# Patient Record
Sex: FEMALE | Race: OTHER | Employment: UNEMPLOYED
[De-identification: names, ages, dates, MRNs, and addresses within clinical notes are randomized per-mention and may not be internally consistent; named-entity substitution may affect disease eponyms.]

---
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
## 2024-02-12 NOTE — PROGRESS NOTES
[facility] [place] PHYSICIAN GROUP  [facility]  DR. ABRAM GOODWIN  [address], [address]  [place] [address]      Date of Visit:  2024  Patient Name: Albina Nelson   Patient :  2005     CHIEF COMPLAINT:     Albina Nelson is a 18 y.o. female who presents today for an general visit to be evaluated for the following condition(s):  Chief Complaint   Patient presents with    3 Month Follow-Up    Asthma     REVIEW OF SYSTEM      Review of Systems   Constitutional:  Negative for chills and fever.   HENT:  Negative for congestion and sore throat.    Eyes:  Negative for discharge.   Respiratory:  Negative for chest tightness and shortness of breath.    Cardiovascular:  Negative for chest pain and palpitations.   Gastrointestinal:  Negative for abdominal pain.   Psychiatric/Behavioral:  The patient is nervous/anxious.        HISTORY OF PRESENT ILLNESS     18F PMH exercise induced asthma, bipolar/ADHD/anxiety, PMDD, mid back pain here for productive cough and wheezing.     She has been doing well with her current asthma regimen. She is using the advair as needed if she can't find her albuterol in [place].     She has transitioned to living by herself in her apartment. Her car broke down recently. She admits she has been more stressed out recently in light of this. She was previously seeing Dr. Valle from psychiatry but has been hesitant about seeing provider because she does not agree with her diagnosis of bipolar diosrder. She doesn't want to be stuck on medication for her bipolar disorder and is seeking an as needed medication for anxiety while in college. She was on atarax in the past but she only used it to help her fall asleep. She hasn't gotten around to establishing with another psychiatrist that was dicussed at last visit.     ----    She is having worsening anxiety before taking tests. She has been off lamictal since January for her bipolar disorder. She does not believe she

## 2024-02-13 ENCOUNTER — OFFICE VISIT (OUTPATIENT)
Dept: FAMILY MEDICINE CLINIC | Age: 19
End: 2024-02-13
Payer: COMMERCIAL

## 2024-02-13 VITALS
OXYGEN SATURATION: 98 % | HEIGHT: 63 IN | BODY MASS INDEX: 28 KG/M2 | DIASTOLIC BLOOD PRESSURE: 65 MMHG | TEMPERATURE: 98.4 F | WEIGHT: 158 LBS | HEART RATE: 81 BPM | SYSTOLIC BLOOD PRESSURE: 96 MMHG

## 2024-02-13 DIAGNOSIS — F41.9 ANXIETY: Primary | ICD-10-CM

## 2024-02-13 DIAGNOSIS — F31.0 BIPOLAR AFFECTIVE DISORDER, CURRENT EPISODE HYPOMANIC (HCC): ICD-10-CM

## 2024-02-13 DIAGNOSIS — J45.990 EXERCISE-INDUCED ASTHMA: ICD-10-CM

## 2024-02-13 PROCEDURE — 1036F TOBACCO NON-USER: CPT | Performed by: STUDENT IN AN ORGANIZED HEALTH CARE EDUCATION/TRAINING PROGRAM

## 2024-02-13 PROCEDURE — G8417 CALC BMI ABV UP PARAM F/U: HCPCS | Performed by: STUDENT IN AN ORGANIZED HEALTH CARE EDUCATION/TRAINING PROGRAM

## 2024-02-13 PROCEDURE — G8482 FLU IMMUNIZE ORDER/ADMIN: HCPCS | Performed by: STUDENT IN AN ORGANIZED HEALTH CARE EDUCATION/TRAINING PROGRAM

## 2024-02-13 PROCEDURE — 99214 OFFICE O/P EST MOD 30 MIN: CPT | Performed by: STUDENT IN AN ORGANIZED HEALTH CARE EDUCATION/TRAINING PROGRAM

## 2024-02-13 PROCEDURE — G8427 DOCREV CUR MEDS BY ELIG CLIN: HCPCS | Performed by: STUDENT IN AN ORGANIZED HEALTH CARE EDUCATION/TRAINING PROGRAM

## 2024-03-04 ENCOUNTER — TELEPHONE (OUTPATIENT)
Dept: FAMILY MEDICINE CLINIC | Age: 19
End: 2024-03-04

## 2024-03-04 NOTE — TELEPHONE ENCOUNTER
Patient triaged from [facility], is SOB and has a fever. I instructed her to go to an urgent care and she states she will go.

## 2024-05-16 RX ORDER — ALBUTEROL SULFATE 2.5 MG/3ML
SOLUTION RESPIRATORY (INHALATION)
Qty: 180 ML | Refills: 1 | Status: SHIPPED | OUTPATIENT
Start: 2024-05-16

## 2024-07-26 ENCOUNTER — TELEPHONE (OUTPATIENT)
Dept: FAMILY MEDICINE CLINIC | Age: 19
End: 2024-07-26

## 2024-07-26 NOTE — TELEPHONE ENCOUNTER
----- Message from Yury Rosenthal sent at 7/26/2024 10:40 AM EDT -----  Regarding: ECC Message to Provider  ECC Message to Provider    Relationship to Patient: Guardian, Mother, Myla     Additional Information, pt was been to the urgent care today and been informed that she needs to have her blood work done however pt's mother would like to know if the pt need to have an appt first with the PCP so they could request her blood work done or she can go ahead and do the blood work without seeing the PCP  --------------------------------------------------------------------------------------------------------------------------    Call Back Information: OK to leave message on voicemail  Preferred Call Back Number: Phone [phone]

## 2024-08-01 NOTE — PROGRESS NOTES
[facility] PHYSICIAN GROUP  [facility]  DR. ABRAM GOODWIN  [address], [address]  [place] [address]      Date of Visit:  2024  Patient Name: Albina Nelson   Patient :  2005     CHIEF COMPLAINT:     Albina Nelson is a 19 y.o. female who presents today for an general visit to be evaluated for the following condition(s):  Chief Complaint   Patient presents with    Follow-up     Urgent care on 24   Bruising   Nausea  Fatigue      REVIEW OF SYSTEM      Review of Systems   Constitutional:  Negative for chills and fever.   HENT:  Negative for congestion and sore throat.    Eyes:  Negative for discharge.   Respiratory:  Negative for chest tightness and shortness of breath.    Cardiovascular:  Negative for chest pain and palpitations.   Gastrointestinal:  Negative for abdominal pain.   Skin:  Positive for rash.   Psychiatric/Behavioral:  The patient is nervous/anxious.        HISTORY OF PRESENT ILLNESS     19F PMH exercise induced asthma, bipolar/ADHD/anxiety, PMDD, mid back pain here for productive cough and wheezing.     She has multiple tender bruises along her bilateral legs after starting new job at [employer]. She went to urgent care and had labwork ordred, but didn't do because of insurance. Rash has been going on for 2 weeks. Started on the left leg and has now migrated to the right leg and bilateral arms. Lesions are tender when she pressed on it. Denies any trauma to her legs. She has been doing ibuprofen 400mg about 3x per week and cold compress with minimal relief. She is having nausea too but no emesis. She is taking zofran for this, which her OBGYN prescribed. No GERD, but having softer stool in light of her symptoms. No constipation or blood in her stool. No stomach pain. She hasn't traveled recently. Feeling fatigued as well. No family history CD or UC.     ----    She has been doing well with her current asthma regimen. She is using the advair as needed if

## 2024-08-05 ENCOUNTER — OFFICE VISIT (OUTPATIENT)
Dept: FAMILY MEDICINE CLINIC | Age: 19
End: 2024-08-05
Payer: MEDICAID

## 2024-08-05 ENCOUNTER — HOSPITAL ENCOUNTER (OUTPATIENT)
Age: 19
Discharge: HOME | End: 2024-08-05
Payer: MEDICAID

## 2024-08-05 VITALS
HEART RATE: 64 BPM | HEIGHT: 63 IN | SYSTOLIC BLOOD PRESSURE: 100 MMHG | RESPIRATION RATE: 16 BRPM | TEMPERATURE: 97.3 F | WEIGHT: 145.2 LBS | OXYGEN SATURATION: 100 % | BODY MASS INDEX: 25.73 KG/M2 | DIASTOLIC BLOOD PRESSURE: 64 MMHG

## 2024-08-05 DIAGNOSIS — L30.9 DERMATITIS: ICD-10-CM

## 2024-08-05 DIAGNOSIS — L30.9 DERMATITIS: Primary | ICD-10-CM

## 2024-08-05 DIAGNOSIS — J45.990 EXERCISE-INDUCED ASTHMA: ICD-10-CM

## 2024-08-05 LAB
ASO AB SERPL-ACNC: 285 IU/ML (ref 0–150)
BASOPHILS # BLD: 0.07 K/UL (ref 0–0.2)
BASOPHILS NFR BLD: 1 % (ref 0–2)
CRP SERPL HS-MCNC: 4.5 MG/L (ref 0–5)
EOSINOPHIL # BLD: 0.31 K/UL (ref 0–0.44)
EOSINOPHILS RELATIVE PERCENT: 4 % (ref 1–4)
ERYTHROCYTE [DISTWIDTH] IN BLOOD BY AUTOMATED COUNT: 13 % (ref 11.8–14.4)
ERYTHROCYTE [SEDIMENTATION RATE] IN BLOOD BY PHOTOMETRIC METHOD: 8 MM/HR (ref 0–20)
HCT VFR BLD AUTO: 38.7 % (ref 36.3–47.1)
HGB BLD-MCNC: 12.1 G/DL (ref 11.9–15.1)
IMM GRANULOCYTES # BLD AUTO: <0.03 K/UL (ref 0–0.3)
IMM GRANULOCYTES NFR BLD: 0 %
INR PPP: 1
LYMPHOCYTES NFR BLD: 2.66 K/UL (ref 1.2–5.2)
LYMPHOCYTES RELATIVE PERCENT: 36 % (ref 25–45)
MCH RBC QN AUTO: 29.4 PG (ref 25.2–33.5)
MCHC RBC AUTO-ENTMCNC: 31.3 G/DL (ref 28.4–34.8)
MCV RBC AUTO: 94.2 FL (ref 82.6–102.9)
MONOCYTES NFR BLD: 0.36 K/UL (ref 0.1–1.4)
MONOCYTES NFR BLD: 5 % (ref 2–8)
NEUTROPHILS NFR BLD: 54 % (ref 34–64)
NEUTS SEG NFR BLD: 4.06 K/UL (ref 1.8–8)
NRBC BLD-RTO: 0 PER 100 WBC
PARTIAL THROMBOPLASTIN TIME: 25.8 SEC (ref 21.3–31.3)
PLATELET # BLD AUTO: 352 K/UL (ref 138–453)
PMV BLD AUTO: 10.7 FL (ref 8.1–13.5)
PROTHROMBIN TIME: 11 SEC (ref 9.4–12.6)
RBC # BLD AUTO: 4.11 M/UL (ref 3.95–5.11)
WBC OTHER # BLD: 7.5 K/UL (ref 4.5–13.5)

## 2024-08-05 PROCEDURE — 85245 CLOT FACTOR VIII VW RISTOCTN: CPT

## 2024-08-05 PROCEDURE — 85652 RBC SED RATE AUTOMATED: CPT

## 2024-08-05 PROCEDURE — 99213 OFFICE O/P EST LOW 20 MIN: CPT | Performed by: STUDENT IN AN ORGANIZED HEALTH CARE EDUCATION/TRAINING PROGRAM

## 2024-08-05 PROCEDURE — 85610 PROTHROMBIN TIME: CPT

## 2024-08-05 PROCEDURE — 86063 ANTISTREPTOLYSIN O SCREEN: CPT

## 2024-08-05 PROCEDURE — 85246 CLOT FACTOR VIII VW ANTIGEN: CPT

## 2024-08-05 PROCEDURE — 85730 THROMBOPLASTIN TIME PARTIAL: CPT

## 2024-08-05 PROCEDURE — 86140 C-REACTIVE PROTEIN: CPT

## 2024-08-05 PROCEDURE — 85220 BLOOC CLOT FACTOR V TEST: CPT

## 2024-08-05 PROCEDURE — 36415 COLL VENOUS BLD VENIPUNCTURE: CPT

## 2024-08-05 PROCEDURE — 85025 COMPLETE CBC W/AUTO DIFF WBC: CPT

## 2024-08-05 RX ORDER — PREDNISONE 20 MG/1
20 TABLET ORAL 2 TIMES DAILY
Qty: 10 TABLET | Refills: 0 | Status: SHIPPED | OUTPATIENT
Start: 2024-08-05 | End: 2024-08-10

## 2024-08-05 RX ORDER — FLUTICASONE PROPIONATE AND SALMETEROL 250; 50 UG/1; UG/1
1 POWDER RESPIRATORY (INHALATION) 2 TIMES DAILY PRN
Qty: 60 EACH | Refills: 3 | Status: SHIPPED | OUTPATIENT
Start: 2024-08-05

## 2024-08-05 SDOH — ECONOMIC STABILITY: FOOD INSECURITY: WITHIN THE PAST 12 MONTHS, THE FOOD YOU BOUGHT JUST DIDN'T LAST AND YOU DIDN'T HAVE MONEY TO GET MORE.: NEVER TRUE

## 2024-08-05 SDOH — ECONOMIC STABILITY: HOUSING INSECURITY
IN THE LAST 12 MONTHS, WAS THERE A TIME WHEN YOU DID NOT HAVE A STEADY PLACE TO SLEEP OR SLEPT IN A SHELTER (INCLUDING NOW)?: NO

## 2024-08-05 SDOH — ECONOMIC STABILITY: INCOME INSECURITY: HOW HARD IS IT FOR YOU TO PAY FOR THE VERY BASICS LIKE FOOD, HOUSING, MEDICAL CARE, AND HEATING?: NOT HARD AT ALL

## 2024-08-05 SDOH — ECONOMIC STABILITY: FOOD INSECURITY: WITHIN THE PAST 12 MONTHS, YOU WORRIED THAT YOUR FOOD WOULD RUN OUT BEFORE YOU GOT MONEY TO BUY MORE.: NEVER TRUE

## 2024-08-08 LAB
VWF AG ACT/NOR PPP IA: 117 % (ref 52–214)
VWF:RCO ACT/NOR PPP PL AGG: 102 % (ref 51–215)

## 2024-08-12 LAB — FACT V ACT/NOR PPP: 103 % (ref 50–150)

## 2024-08-15 NOTE — PROGRESS NOTES
[facility]  [facility]  DR. ABRAM GOODWIN  [address], [address]  [place] [address]      Date of Visit:  2024  Patient Name: Albina Nelson   Patient :  2005     CHIEF COMPLAINT:     Albina Nelson is a 19 y.o. female who presents today for an general visit to be evaluated for the following condition(s):  Chief Complaint   Patient presents with    Other     Dermatitis follow up     REVIEW OF SYSTEM      Review of Systems   Constitutional:  Negative for chills and fever.   HENT:  Negative for congestion and sore throat.    Eyes:  Negative for discharge.   Respiratory:  Negative for chest tightness and shortness of breath.    Cardiovascular:  Negative for chest pain and palpitations.   Gastrointestinal:  Negative for abdominal pain.   Skin:  Positive for rash.       HISTORY OF PRESENT ILLNESS     19F PMH exercise induced asthma, bipolar/ADHD/anxiety, PMDD, mid back pain here for productive cough and wheezing.     Her leg rash from last month remains the same. Having worsening bruising on the left leg. Has been taking naproxen and prednisone without improvement of her rash, which has been going on for 4 weeks now. Her workup for erythema nodosum showed elevated ASO. She scheduled an appointment with dermatology, but her appointment isn't until October.     She slammed her left pinky finger against a door 2 weeks ago. Still having notable pain with moving her finger despite her injury several weeks ago.     ----    She has multiple tender bruises along her bilateral legs after starting new job at [employer]. She went to urgent care and had labwork ordred, but didn't do because of insurance. Rash has been going on for 2 weeks. Started on the left leg and has now migrated to the right leg and bilateral arms. Lesions are tender when she pressed on it. Denies any trauma to her legs. She has been doing ibuprofen 400mg about 3x per week and cold compress with

## 2024-08-19 ENCOUNTER — OFFICE VISIT (OUTPATIENT)
Dept: FAMILY MEDICINE CLINIC | Age: 19
End: 2024-08-19
Payer: MEDICAID

## 2024-08-19 VITALS — BODY MASS INDEX: 25.87 KG/M2 | HEIGHT: 63 IN | WEIGHT: 146 LBS | RESPIRATION RATE: 16 BRPM

## 2024-08-19 DIAGNOSIS — S69.92XA INJURY OF LEFT HAND, INITIAL ENCOUNTER: ICD-10-CM

## 2024-08-19 DIAGNOSIS — L52 ERYTHEMA NODOSUM: Primary | ICD-10-CM

## 2024-08-19 PROCEDURE — 99213 OFFICE O/P EST LOW 20 MIN: CPT | Performed by: STUDENT IN AN ORGANIZED HEALTH CARE EDUCATION/TRAINING PROGRAM

## 2024-08-19 RX ORDER — AMOXICILLIN 875 MG/1
875 TABLET, COATED ORAL 2 TIMES DAILY
Qty: 14 TABLET | Refills: 0 | Status: SHIPPED | OUTPATIENT
Start: 2024-08-19 | End: 2024-08-26

## 2024-08-19 RX ORDER — LORATADINE 10 MG/1
10 TABLET ORAL DAILY
COMMUNITY

## 2024-11-05 ENCOUNTER — HOSPITAL ENCOUNTER (OUTPATIENT)
Age: 19
Discharge: HOME | End: 2024-11-05

## 2024-11-05 PROCEDURE — 86481 TB AG RESPONSE T-CELL SUSP: CPT

## 2024-11-08 LAB — T-SPOT TB TEST: NORMAL

## 2025-01-31 DIAGNOSIS — N94.6 DYSMENORRHEA: ICD-10-CM

## 2025-01-31 RX ORDER — IBUPROFEN 800 MG/1
800 TABLET, FILM COATED ORAL 2 TIMES DAILY PRN
Qty: 60 TABLET | Refills: 5 | OUTPATIENT
Start: 2025-01-31

## 2025-03-26 ENCOUNTER — OFFICE VISIT (OUTPATIENT)
Dept: OBGYN CLINIC | Age: 20
End: 2025-03-26
Payer: COMMERCIAL

## 2025-03-26 ENCOUNTER — HOSPITAL ENCOUNTER (OUTPATIENT)
Age: 20
Setting detail: SPECIMEN
Discharge: HOME | End: 2025-03-26

## 2025-03-26 VITALS — DIASTOLIC BLOOD PRESSURE: 87 MMHG | SYSTOLIC BLOOD PRESSURE: 116 MMHG | WEIGHT: 137.3 LBS | BODY MASS INDEX: 24.32 KG/M2

## 2025-03-26 DIAGNOSIS — Z75.8 DOES NOT HAVE PRIMARY CARE PROVIDER: ICD-10-CM

## 2025-03-26 DIAGNOSIS — Z20.2 POTENTIAL EXPOSURE TO STD: ICD-10-CM

## 2025-03-26 DIAGNOSIS — Z00.00 ENCOUNTER FOR WELL WOMAN EXAM WITHOUT GYNECOLOGICAL EXAM: Primary | ICD-10-CM

## 2025-03-26 DIAGNOSIS — N94.6 DYSMENORRHEA: ICD-10-CM

## 2025-03-26 DIAGNOSIS — N94.10 DYSPAREUNIA IN FEMALE: ICD-10-CM

## 2025-03-26 PROCEDURE — 99395 PREV VISIT EST AGE 18-39: CPT

## 2025-03-26 PROCEDURE — 36415 COLL VENOUS BLD VENIPUNCTURE: CPT

## 2025-03-26 PROCEDURE — 99213 OFFICE O/P EST LOW 20 MIN: CPT

## 2025-03-26 RX ORDER — IBUPROFEN 800 MG/1
800 TABLET, FILM COATED ORAL EVERY 8 HOURS PRN
Qty: 180 TABLET | Refills: 3 | Status: SHIPPED | OUTPATIENT
Start: 2025-03-26

## 2025-03-26 SDOH — ECONOMIC STABILITY: FOOD INSECURITY: WITHIN THE PAST 12 MONTHS, THE FOOD YOU BOUGHT JUST DIDN'T LAST AND YOU DIDN'T HAVE MONEY TO GET MORE.: NEVER TRUE

## 2025-03-26 SDOH — ECONOMIC STABILITY: FOOD INSECURITY: WITHIN THE PAST 12 MONTHS, YOU WORRIED THAT YOUR FOOD WOULD RUN OUT BEFORE YOU GOT MONEY TO BUY MORE.: NEVER TRUE

## 2025-03-26 ASSESSMENT — ANXIETY QUESTIONNAIRES
5. BEING SO RESTLESS THAT IT IS HARD TO SIT STILL: NOT AT ALL
GAD7 TOTAL SCORE: 2
1. FEELING NERVOUS, ANXIOUS, OR ON EDGE: SEVERAL DAYS
3. WORRYING TOO MUCH ABOUT DIFFERENT THINGS: NOT AT ALL
2. NOT BEING ABLE TO STOP OR CONTROL WORRYING: NOT AT ALL
IF YOU CHECKED OFF ANY PROBLEMS ON THIS QUESTIONNAIRE, HOW DIFFICULT HAVE THESE PROBLEMS MADE IT FOR YOU TO DO YOUR WORK, TAKE CARE OF THINGS AT HOME, OR GET ALONG WITH OTHER PEOPLE: SOMEWHAT DIFFICULT
7. FEELING AFRAID AS IF SOMETHING AWFUL MIGHT HAPPEN: NOT AT ALL
6. BECOMING EASILY ANNOYED OR IRRITABLE: NOT AT ALL
4. TROUBLE RELAXING: SEVERAL DAYS

## 2025-03-26 ASSESSMENT — PATIENT HEALTH QUESTIONNAIRE - PHQ9
SUM OF ALL RESPONSES TO PHQ QUESTIONS 1-9: 0
2. FEELING DOWN, DEPRESSED OR HOPELESS: NOT AT ALL
SUM OF ALL RESPONSES TO PHQ QUESTIONS 1-9: 0
SUM OF ALL RESPONSES TO PHQ QUESTIONS 1-9: 0
1. LITTLE INTEREST OR PLEASURE IN DOING THINGS: NOT AT ALL
SUM OF ALL RESPONSES TO PHQ QUESTIONS 1-9: 0

## 2025-03-26 ASSESSMENT — ENCOUNTER SYMPTOMS
RESPIRATORY NEGATIVE: 1
ABDOMINAL PAIN: 1

## 2025-03-26 NOTE — PROGRESS NOTES
[facility]STETR[facility] AND [facility]YNECOLOGY  [address]   [address]  [facility] [address]  Dept: [phone]    Patient Name: Albina Nelson  Patient Age: 19 y.o.  Date of Visit: 3/26/2025    Subjective  No chief complaint on file.    HPI:  Albina Nelson is a 19 y.o. female who arrives to office as an established patient for annual exam and med refill.     Patient denies concerns today.  Patient reports is  sexually active with 1 female partner(s).   Patient reports a new sex partner within the last 3 months.  Patient has pain with sex,   Patient denies a history of sexually transmitted infection(s).  Patient does want screening for sexually transmitted infection(s).  Reports is not on contraception.    Review of Systems   Constitutional: Negative.    Respiratory: Negative.     Cardiovascular: Negative.    Gastrointestinal:  Positive for abdominal pain.   Endocrine: Negative.    Genitourinary:  Positive for dyspareunia and menstrual problem.   Skin: Negative.    Neurological: Negative.    Psychiatric/Behavioral: Negative.       Preventive Health Screening:   Date of last pap: N/A  History of abnormal pap: N/A         HPV typing/date: N/A  Date of last mammogram: N/A   Date of last DEXA scan: N/A   Date of last colonoscopy (start age 45): N/A    Preventive screening: No - referral sent    Family history of Breast, Ovarian, Colon or Uterine Cancer:  no     If Yes see scanned worksheet        3/26/2025     3:11 PM 2/13/2024     8:28 AM 4/13/2023    11:14 AM 4/21/2022     8:31 AM 7/21/2020     9:44 AM 8/21/2019     4:44 PM 11/9/2017     9:49 AM   PHQ Scores   PHQ2 Score 0 3 2 0 1 0 5   PHQ9 Score 0 8 3 0 5 1 19     Interpretation of Total Score Depression Severity: 1-4 = Minimal depression, 5-9 = Mild depression, 10-14 = Moderate depression, 15-19 = Moderately severe depression, 20-27 = Severe depression      3/26/2025     3:11 PM 4/21/2022     8:00

## 2025-03-26 NOTE — PROGRESS NOTES
Patient is here for annual without pap due to age.   Medication refills ? No         3/26/2025     3:11 PM   PHQ-9    Little interest or pleasure in doing things 0   Feeling down, depressed, or hopeless 0   PHQ-2 Score 0   PHQ-9 Total Score 0          3/26/2025     3:11 PM 4/21/2022     8:00 AM   KIMMIE-7 SCREENING   Feeling nervous, anxious, or on edge Several days Not at all   Not being able to stop or control worrying Not at all Not at all   Worrying too much about different things Not at all Not at all   Trouble relaxing Several days Several days   Being so restless that it is hard to sit still Not at all Not at all   Becoming easily annoyed or irritable Not at all Not at all   Feeling afraid as if something awful might happen Not at all Not at all   KIMMIE-7 Total Score 2 1   How difficult have these problems made it for you to do your work, take care of things at home, or get along with other people? Somewhat difficult

## 2025-03-27 ENCOUNTER — RESULTS FOLLOW-UP (OUTPATIENT)
Dept: OBGYN CLINIC | Age: 20
End: 2025-03-27

## 2025-03-27 DIAGNOSIS — A74.9 CHLAMYDIA INFECTION: Primary | ICD-10-CM

## 2025-03-27 LAB
CHLAMYDIA DNA UR QL NAA+PROBE: ABNORMAL
N GONORRHOEA DNA UR QL NAA+PROBE: NEGATIVE
SPECIMEN DESCRIPTION: ABNORMAL

## 2025-03-27 RX ORDER — DOXYCYCLINE HYCLATE 100 MG
100 TABLET ORAL 2 TIMES DAILY
Qty: 14 TABLET | Refills: 0 | Status: SHIPPED | OUTPATIENT
Start: 2025-03-27 | End: 2025-04-03

## 2025-04-07 ENCOUNTER — TELEPHONE (OUTPATIENT)
Dept: PRIMARY CARE CLINIC | Age: 20
End: 2025-04-07

## 2025-04-07 NOTE — TELEPHONE ENCOUNTER
Writer tessy and sent a [employer] message asking if patient would like to establish care with Elvin Samayoa PA-C since we got the referral to our office.  May is when he has his next opening appointments.

## 2025-05-20 ENCOUNTER — OFFICE VISIT (OUTPATIENT)
Dept: PRIMARY CARE CLINIC | Age: 20
End: 2025-05-20
Payer: COMMERCIAL

## 2025-05-20 ENCOUNTER — TELEPHONE (OUTPATIENT)
Dept: PRIMARY CARE CLINIC | Age: 20
End: 2025-05-20

## 2025-05-20 VITALS
OXYGEN SATURATION: 99 % | DIASTOLIC BLOOD PRESSURE: 60 MMHG | SYSTOLIC BLOOD PRESSURE: 100 MMHG | WEIGHT: 134 LBS | HEART RATE: 77 BPM | BODY MASS INDEX: 23.74 KG/M2 | HEIGHT: 63 IN

## 2025-05-20 DIAGNOSIS — J45.40 MODERATE PERSISTENT ASTHMA, UNSPECIFIED WHETHER COMPLICATED: ICD-10-CM

## 2025-05-20 DIAGNOSIS — F41.1 GAD (GENERALIZED ANXIETY DISORDER): ICD-10-CM

## 2025-05-20 DIAGNOSIS — L30.9 DERMATITIS: ICD-10-CM

## 2025-05-20 DIAGNOSIS — Z20.2 POTENTIAL EXPOSURE TO STD: ICD-10-CM

## 2025-05-20 DIAGNOSIS — A74.9 CHLAMYDIA INFECTION: ICD-10-CM

## 2025-05-20 DIAGNOSIS — F32.81 PMDD (PREMENSTRUAL DYSPHORIC DISORDER): ICD-10-CM

## 2025-05-20 DIAGNOSIS — G43.111 INTRACTABLE MIGRAINE WITH AURA WITH STATUS MIGRAINOSUS: ICD-10-CM

## 2025-05-20 DIAGNOSIS — J45.990 EXERCISE-INDUCED ASTHMA: ICD-10-CM

## 2025-05-20 DIAGNOSIS — F31.0 BIPOLAR AFFECTIVE DISORDER, CURRENT EPISODE HYPOMANIC (HCC): ICD-10-CM

## 2025-05-20 DIAGNOSIS — L70.0 ACNE VULGARIS: ICD-10-CM

## 2025-05-20 DIAGNOSIS — T78.40XD ALLERGY, SUBSEQUENT ENCOUNTER: ICD-10-CM

## 2025-05-20 DIAGNOSIS — F32.A DEPRESSION, UNSPECIFIED DEPRESSION TYPE: Primary | ICD-10-CM

## 2025-05-20 DIAGNOSIS — F90.9 ATTENTION DEFICIT HYPERACTIVITY DISORDER (ADHD), UNSPECIFIED ADHD TYPE: ICD-10-CM

## 2025-05-20 DIAGNOSIS — F33.1 MODERATE EPISODE OF RECURRENT MAJOR DEPRESSIVE DISORDER (HCC): ICD-10-CM

## 2025-05-20 PROBLEM — T78.40XA ALLERGIES: Status: ACTIVE | Noted: 2025-05-20

## 2025-05-20 PROBLEM — R45.851 SUICIDAL IDEATION: Status: RESOLVED | Noted: 2017-02-15 | Resolved: 2025-05-20

## 2025-05-20 LAB
ANTISTREPTOLYSIN-O: 280 IU/ML (ref 0–150)
HEPATITIS C ANTIBODY: NONREACTIVE
HIV AG/AB: NONREACTIVE
T. PALLIDUM, IGG: NONREACTIVE

## 2025-05-20 PROCEDURE — 99214 OFFICE O/P EST MOD 30 MIN: CPT | Performed by: PHYSICIAN ASSISTANT

## 2025-05-20 RX ORDER — FLUTICASONE PROPIONATE AND SALMETEROL 50; 250 UG/1; UG/1
1 POWDER RESPIRATORY (INHALATION) 2 TIMES DAILY
Qty: 60 EACH | Refills: 3 | Status: SHIPPED | OUTPATIENT
Start: 2025-05-20 | End: 2025-06-19

## 2025-05-20 RX ORDER — LORATADINE 10 MG/1
10 TABLET ORAL DAILY
Qty: 30 TABLET | Refills: 2 | Status: SHIPPED | OUTPATIENT
Start: 2025-05-20

## 2025-05-20 RX ORDER — FLUTICASONE PROPIONATE AND SALMETEROL 250; 50 UG/1; UG/1
1 POWDER RESPIRATORY (INHALATION) 2 TIMES DAILY
Qty: 60 EACH | Refills: 3 | Status: SHIPPED | OUTPATIENT
Start: 2025-05-20 | End: 2025-05-20 | Stop reason: SDUPTHER

## 2025-05-20 RX ORDER — CLINDAMYCIN PHOSPHATE 10 MG/ML
SOLUTION TOPICAL
Qty: 90 EACH | Refills: 2 | Status: SHIPPED | OUTPATIENT
Start: 2025-05-20

## 2025-05-20 RX ORDER — SUMATRIPTAN SUCCINATE 25 MG/1
25 TABLET ORAL DAILY PRN
Qty: 9 TABLET | Refills: 0 | Status: SHIPPED | OUTPATIENT
Start: 2025-05-20

## 2025-05-20 RX ORDER — TOPIRAMATE 25 MG/1
25 TABLET, FILM COATED ORAL 2 TIMES DAILY
Qty: 60 TABLET | Refills: 5 | Status: SHIPPED | OUTPATIENT
Start: 2025-05-20

## 2025-05-20 RX ORDER — FLUTICASONE PROPIONATE 50 MCG
2 SPRAY, SUSPENSION (ML) NASAL DAILY
Qty: 16 G | Refills: 0 | Status: SHIPPED | OUTPATIENT
Start: 2025-05-20

## 2025-05-20 ASSESSMENT — ENCOUNTER SYMPTOMS
DIARRHEA: 0
ABDOMINAL DISTENTION: 0
SHORTNESS OF BREATH: 0
COUGH: 0
CHEST TIGHTNESS: 0
CONSTIPATION: 0
ABDOMINAL PAIN: 0
SORE THROAT: 1

## 2025-05-20 NOTE — PROGRESS NOTES
[facility]  [facility]  [address]  [facility] [address]  Dept: [phone]    Albina Nelson is a 19 y.o. female Established patient, who presents today for her medical conditions/complaints as noted below.      Chief Complaint   Patient presents with    New Patient     Patient states they the following concerns with est care, medications, asthma attack        HPI:     History of Present Illness  The patient presents today with concerns of establishing care. She has a history of ADHD, bipolar disorder, generalized anxiety disorder, premenstrual dysphoric disorder, and asthma. Currently, she is taking Advair, albuterol, and loratadine.    She reports experiencing severe asthma attacks, which were previously managed with seasonal steroids prescribed by her former primary care physician, Dr. Christie. 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 She has been using her albuterol inhaler and Advair, which she finds effective in preventing asthma attacks, taking one puff twice daily. Last year, she only had one asthma attack due to illness. Her asthma is exacerbated by illnesses and allergies, particularly to her two cats. She manages her cat allergy by limiting their access to her room and avoiding direct contact.    She also suffers from allergies, which are particularly bothersome during this season. She is highly allergic to her two cats, and if she touches them and then touches her face, she gets welts within five minutes. She takes Claritin and uses Flonase, although she has run out of the latter.    She has been diagnosed with bipolar disorder and ADHD but does not have a psychiatrist. Her therapist suspects borderline personality disorder, but this is unconfirmed as she has not consulted a psychiatrist due to her reluctance to take medication. She reports that her symptoms are well-managed and attributes any exacerbation to lack

## 2025-05-20 NOTE — TELEPHONE ENCOUNTER
If agreeable please send inhaler as Advair Diskus OFELIA rather than generic per insurance request

## 2025-05-22 ENCOUNTER — RESULTS FOLLOW-UP (OUTPATIENT)
Dept: PRIMARY CARE CLINIC | Age: 20
End: 2025-05-22

## 2025-05-22 DIAGNOSIS — J02.9 SORE THROAT: Primary | ICD-10-CM

## 2025-05-28 NOTE — RESULT ENCOUNTER NOTE
The results showed positive ASO screen which has not decreased in the last months.  Recommend trial of antibiotic to see if this helps with sore throat.  Amoxicillin sent in for patient.

## 2025-05-30 RX ORDER — AMOXICILLIN 500 MG/1
500 CAPSULE ORAL 2 TIMES DAILY
Qty: 20 CAPSULE | Refills: 0 | Status: SHIPPED | OUTPATIENT
Start: 2025-05-30 | End: 2025-06-09

## 2025-06-04 DIAGNOSIS — A74.9 CHLAMYDIA INFECTION: ICD-10-CM

## 2025-06-05 LAB
C. TRACHOMATIS DNA ,URINE: NEGATIVE
N. GONORRHOEAE DNA, URINE: NEGATIVE

## 2025-07-01 ENCOUNTER — OFFICE VISIT (OUTPATIENT)
Dept: PRIMARY CARE CLINIC | Age: 20
End: 2025-07-01
Payer: COMMERCIAL

## 2025-07-01 VITALS
OXYGEN SATURATION: 96 % | HEART RATE: 76 BPM | SYSTOLIC BLOOD PRESSURE: 110 MMHG | WEIGHT: 135 LBS | DIASTOLIC BLOOD PRESSURE: 70 MMHG | BODY MASS INDEX: 23.92 KG/M2 | HEIGHT: 63 IN

## 2025-07-01 DIAGNOSIS — F41.1 GAD (GENERALIZED ANXIETY DISORDER): ICD-10-CM

## 2025-07-01 DIAGNOSIS — F90.9 ATTENTION DEFICIT HYPERACTIVITY DISORDER (ADHD), UNSPECIFIED ADHD TYPE: ICD-10-CM

## 2025-07-01 DIAGNOSIS — F32.A DEPRESSION, UNSPECIFIED DEPRESSION TYPE: ICD-10-CM

## 2025-07-01 DIAGNOSIS — F31.0 BIPOLAR AFFECTIVE DISORDER, CURRENT EPISODE HYPOMANIC (HCC): ICD-10-CM

## 2025-07-01 DIAGNOSIS — F33.1 MODERATE EPISODE OF RECURRENT MAJOR DEPRESSIVE DISORDER (HCC): Primary | ICD-10-CM

## 2025-07-01 DIAGNOSIS — G43.111 INTRACTABLE MIGRAINE WITH AURA WITH STATUS MIGRAINOSUS: ICD-10-CM

## 2025-07-01 DIAGNOSIS — J45.40 MODERATE PERSISTENT ASTHMA, UNSPECIFIED WHETHER COMPLICATED: ICD-10-CM

## 2025-07-01 PROBLEM — M54.9 MID BACK PAIN: Status: RESOLVED | Noted: 2023-02-07 | Resolved: 2025-07-01

## 2025-07-01 PROBLEM — A74.9 CHLAMYDIA INFECTION: Status: RESOLVED | Noted: 2025-03-27 | Resolved: 2025-07-01

## 2025-07-01 PROBLEM — Z87.828 HISTORY OF MOTOR VEHICLE ACCIDENT: Status: RESOLVED | Noted: 2023-02-07 | Resolved: 2025-07-01

## 2025-07-01 PROCEDURE — 99214 OFFICE O/P EST MOD 30 MIN: CPT | Performed by: PHYSICIAN ASSISTANT

## 2025-07-01 RX ORDER — SUMATRIPTAN 50 MG/1
50 TABLET, FILM COATED ORAL
Qty: 9 TABLET | Refills: 5 | Status: SHIPPED | OUTPATIENT
Start: 2025-07-01

## 2025-07-01 RX ORDER — MONTELUKAST SODIUM 10 MG/1
10 TABLET ORAL DAILY
Qty: 30 TABLET | Refills: 3 | Status: SHIPPED | OUTPATIENT
Start: 2025-07-01

## 2025-07-01 ASSESSMENT — ENCOUNTER SYMPTOMS
SHORTNESS OF BREATH: 0
CHEST TIGHTNESS: 0
ABDOMINAL DISTENTION: 0
COUGH: 0
DIARRHEA: 0
ABDOMINAL PAIN: 0
CONSTIPATION: 0
SORE THROAT: 0

## 2025-07-01 NOTE — PROGRESS NOTES
medications.  All patient questions answered. Pt voiced understanding. Reviewed health maintenance.  Instructed to continue current medications, diet and exercise.  Patient agreed with treatment plan. Follow up as directed.     Electronically signed by JAMA Amanda on 7/1/2025 at 10:26 AM

## 2025-08-13 DIAGNOSIS — J45.990 EXERCISE-INDUCED ASTHMA: ICD-10-CM

## 2025-08-13 RX ORDER — ALBUTEROL SULFATE 90 UG/1
2 INHALANT RESPIRATORY (INHALATION) EVERY 6 HOURS PRN
Qty: 1 EACH | Refills: 3 | Status: SHIPPED | OUTPATIENT
Start: 2025-08-13